# Patient Record
Sex: FEMALE | Race: WHITE
[De-identification: names, ages, dates, MRNs, and addresses within clinical notes are randomized per-mention and may not be internally consistent; named-entity substitution may affect disease eponyms.]

---

## 2019-04-14 ENCOUNTER — HOSPITAL ENCOUNTER (EMERGENCY)
Dept: HOSPITAL 62 - ER | Age: 44
Discharge: HOME | End: 2019-04-14
Payer: MEDICARE

## 2019-04-14 VITALS — DIASTOLIC BLOOD PRESSURE: 72 MMHG | SYSTOLIC BLOOD PRESSURE: 118 MMHG

## 2019-04-14 DIAGNOSIS — J06.9: ICD-10-CM

## 2019-04-14 DIAGNOSIS — R52: ICD-10-CM

## 2019-04-14 DIAGNOSIS — J45.909: ICD-10-CM

## 2019-04-14 DIAGNOSIS — B34.9: Primary | ICD-10-CM

## 2019-04-14 DIAGNOSIS — Z91.013: ICD-10-CM

## 2019-04-14 DIAGNOSIS — R11.2: ICD-10-CM

## 2019-04-14 DIAGNOSIS — R05: ICD-10-CM

## 2019-04-14 DIAGNOSIS — Z88.1: ICD-10-CM

## 2019-04-14 DIAGNOSIS — R19.7: ICD-10-CM

## 2019-04-14 LAB
A TYPE INFLUENZA AG: NEGATIVE
ADD MANUAL DIFF: NO
ANION GAP SERPL CALC-SCNC: 9 MMOL/L (ref 5–19)
B INFLUENZA AG: NEGATIVE
BASOPHILS # BLD AUTO: 0 10^3/UL (ref 0–0.2)
BASOPHILS NFR BLD AUTO: 0.7 % (ref 0–2)
BUN SERPL-MCNC: 13 MG/DL (ref 7–20)
CALCIUM: 9 MG/DL (ref 8.4–10.2)
CHLORIDE SERPL-SCNC: 104 MMOL/L (ref 98–107)
CO2 SERPL-SCNC: 25 MMOL/L (ref 22–30)
EOSINOPHIL # BLD AUTO: 0.3 10^3/UL (ref 0–0.6)
EOSINOPHIL NFR BLD AUTO: 5 % (ref 0–6)
ERYTHROCYTE [DISTWIDTH] IN BLOOD BY AUTOMATED COUNT: 13.8 % (ref 11.5–14)
GLUCOSE SERPL-MCNC: 80 MG/DL (ref 75–110)
HCT VFR BLD CALC: 33.4 % (ref 36–47)
HGB BLD-MCNC: 11.3 G/DL (ref 12–15.5)
LYMPHOCYTES # BLD AUTO: 1.2 10^3/UL (ref 0.5–4.7)
LYMPHOCYTES NFR BLD AUTO: 24.5 % (ref 13–45)
MCH RBC QN AUTO: 28.1 PG (ref 27–33.4)
MCHC RBC AUTO-ENTMCNC: 33.8 G/DL (ref 32–36)
MCV RBC AUTO: 83 FL (ref 80–97)
MONOCYTES # BLD AUTO: 0.5 10^3/UL (ref 0.1–1.4)
MONOCYTES NFR BLD AUTO: 9.2 % (ref 3–13)
NEUTROPHILS # BLD AUTO: 3 10^3/UL (ref 1.7–8.2)
NEUTS SEG NFR BLD AUTO: 60.6 % (ref 42–78)
PLATELET # BLD: 187 10^3/UL (ref 150–450)
POTASSIUM SERPL-SCNC: 4.5 MMOL/L (ref 3.6–5)
RBC # BLD AUTO: 4.03 10^6/UL (ref 3.72–5.28)
SODIUM SERPL-SCNC: 137.8 MMOL/L (ref 137–145)
TOTAL CELLS COUNTED % (AUTO): 100 %
WBC # BLD AUTO: 5 10^3/UL (ref 4–10.5)

## 2019-04-14 PROCEDURE — 96361 HYDRATE IV INFUSION ADD-ON: CPT

## 2019-04-14 PROCEDURE — 87804 INFLUENZA ASSAY W/OPTIC: CPT

## 2019-04-14 PROCEDURE — 80048 BASIC METABOLIC PNL TOTAL CA: CPT

## 2019-04-14 PROCEDURE — 36415 COLL VENOUS BLD VENIPUNCTURE: CPT

## 2019-04-14 PROCEDURE — 99283 EMERGENCY DEPT VISIT LOW MDM: CPT

## 2019-04-14 PROCEDURE — 96374 THER/PROPH/DIAG INJ IV PUSH: CPT

## 2019-04-14 PROCEDURE — 85025 COMPLETE CBC W/AUTO DIFF WBC: CPT

## 2019-04-14 PROCEDURE — 71046 X-RAY EXAM CHEST 2 VIEWS: CPT

## 2019-04-14 NOTE — RADIOLOGY REPORT (SQ)
EXAM DESCRIPTION: 



XR CHEST 2 VIEWS



COMPLETED DATE/TME:  04/14/2019 20:12



CLINICAL HISTORY: 



43 years, Female, cough



Compared to 8/21/2016.



FINDINGS: Heart is mildly enlarged. No consolidation or pleural

effusion. No pulmonary edema or pneumothorax.



IMPRESSION:



No acute disease.

## 2019-04-14 NOTE — ER DOCUMENT REPORT
ED General





- General


Chief Complaint: Nausea/Vomiting/Diarrhea


Stated Complaint: FEVER


Time Seen by Provider: 19 20:02


TRAVEL OUTSIDE OF THE [place] IN LAST 30 DAYS: No





- HPI


Notes: 





Patient is a 43-year-old female presents to the emergency department for 

evaluation.  She states she has a cough.  She has had hot and cold chills.  She 

has had nausea with emesis, but no emesis in the last 24 hours.  Emesis was 

nonbloody, nonbilious.  She had multiple episodes of diarrhea.  She believes she

might be dehydrated.  She has pain "all over."





- Related Data


Allergies/Adverse Reactions: 


                                        





erythromycin base [Erythromycin Base] Allergy (Verified 14 13:23)


   Seizures, N&V


Shellfish * [Shellfish] Allergy (Verified 14 13:23)


   Seizures, N&V


sulfamethoxazole [From Septra] Allergy (Verified 14 13:23)


   Seizures, N&V


trimethoprim [From Septra] Allergy (Verified 14 13:23)


   Seizures, N&V











Past Medical History





- General


Information source: Patient





- Social History


Smoking Status: Never Smoker


Chew tobacco use (# tins/day): No


Frequency of alcohol use: None


Drug Abuse: None


Family History: Reviewed & Not Pertinent


Patient has suicidal ideation: No


Patient has homicidal ideation: No





- Past Medical History


Cardiac Medical History: Reports: Hx Hypertension - gestational only


   Denies: Hx Pulmonary Embolism, Hx Heart Murmur


Pulmonary Medical History: Reports: Hx Asthma


   Denies: Hx Sleep Apnea, Hx Tuberculosis


Neurological Medical History: Denies: Hx Cerebrovascular Accident, Hx Seizures -

Medication related only/Patient denies history


Endocrine Medical History: Reports: Hx Hypothyroidism.  Denies: Hx 

Hyperthyroidism


Renal/ Medical History: Denies: Hx Peritoneal Dialysis


GI Medical History: Reports: Hx Gastroesophageal Reflux Disease.  Denies: Hx 

Hiatal Hernia, Hx Ulcer


Musculoskeletal Medical History: Denies Hx Fibromyalgia, Reports Hx 

Musculoskeletal Trauma


Psychiatric Medical History: Reports: Hx Bipolar Disorder - No meds currently


   Denies: Hx Depression, Hx Post Traumatic Stress Disorder, Hx Schizophrenia


Traumatic Medical History: Denies: Hx Fractures


Infectious Medical History: Denies: Hx HIV


Past Surgical History: Reports: Hx  Section, Hx Orthopedic Surgery - 

sugery on fooot when 12, Hx Tonsillectomy





- Immunizations


Immunizations up to date: Yes


Hx Diphtheria, Pertussis, Tetanus Vaccination: Yes - 





Review of Systems





- Review of Systems


Constitutional: See HPI


EENT: See HPI


Cardiovascular: No symptoms reported


Respiratory: See HPI


Gastrointestinal: See HPI


Genitourinary: No symptoms reported


Female Genitourinary: No symptoms reported


Musculoskeletal: No symptoms reported


Skin: No symptoms reported


Neurological/Psychological: No symptoms reported





Physical Exam





- Vital signs


Vitals: 


                                        











Temp Pulse Resp BP Pulse Ox


 


 97.4 F   76   20   135/78 H  98 


 


 19 19:22  19 19:22  19 19:22  19 19:22  19 19:22














- Notes


Notes: 





Vital signs reviewed, please refer to chart.  Patient is normocephalic, 

atraumatic.  Pupils equal round, reactive to light.  Oral mucosa is moist.  

Pharynx without erythema or exudate.  Neck is supple without meningismus.  Heart

is regular rate and rhythm.  Lungs are clear to auscultation bilaterally.  

Abdomen is soft, nontender, normoactive bowel sounds throughout.  Extremities 

without cyanosis, clubbing, edema.  Peripheral pulses are equal.  Skin is warm 

and dry.  Patient is awake, alert, neurological exam is nonfocal.





Course





- Re-evaluation


Re-evalutation: 





19 21:26


Patient presents emergency department for evaluation.  Influenza swab, blood 

work, medications were ordered.  Her chest x-ray is unremarkable.  Influenza 

swab is negative.  We will continue to follow.


19 22:33


Laboratory visitations are unremarkable.  She has no signs of pneumonia.  In

fluenza swab was negative.  We will treat her symptomatically, she was reassured

that she does not need antibiotics at this time.  She is to return to the 

emergency department with worsening or new concerning symptoms of any sort.





- Vital Signs


Vital signs: 


                                        











Temp Pulse Resp BP Pulse Ox


 


 97.4 F   76   20   135/78 H  99 


 


 19 19:22  19 19:22  19 19:22  19 19:22  19 21:00














- Laboratory


Result Diagrams: 


                                 19 21:10





                                 19 21:10


Laboratory results interpreted by me: 


                                        











  19





  21:10


 


Hgb  11.3 L


 


Hct  33.4 L














Discharge





- Discharge


Clinical Impression: 


 Viral syndrome





Diarrhea


Qualifiers:


 Diarrhea type: presumed infectious Qualified Code(s): R19.7 - Diarrhea, 

unspecified





Upper respiratory infection


Qualifiers:


 URI type: unspecified URI Qualified Code(s): J06.9 - Acute upper respiratory 

infection, unspecified





Condition: Stable


Disposition: HOME, SELF-CARE


Instructions:  Diarrhea, Nonspecific ([facility]), Viral Syndrome ([facility])


Additional Instructions: 


Take medications as prescribed.  Rest, stay well-hydrated.  Follow-up with your 

primary care physician this week.  Return to the emergency department with 

worsening or new concerning symptoms of any sort.

## 2020-06-26 NOTE — RADIOLOGY REPORT (SQ)
EXAM DESCRIPTION:  LUMBAR SPINE COMPLETE



IMAGES COMPLETED DATE/TIME:  6/26/2020 12:00 pm



REASON FOR STUDY:  THORACIC BACK PAIN; MULTIFACTORIAL LOW BACK PAIN M54.6  PAIN IN THORACIC SPINE E55
.9  VITAMIN D DEFICIENCY, UNSPECIFIED



COMPARISON:  None.



NUMBER OF VIEWS:  Five views including obliques.



TECHNIQUE:  AP, lateral, oblique, and sacral radiographic images acquired of the lumbar spine.



LIMITATIONS:  None.



FINDINGS:  MINERALIZATION: Normal.

SEGMENTATION: Normal.  No transitional anatomy.

ALIGNMENT: Normal.

VERTEBRAE: Maintained height.  No fracture or worrisome bone lesion.

DISCS: Preserved height.  No significant osteophytes or end plate irregularity.

POSTERIOR ELEMENTS: Pedicles and facets are intact.  No pars defect or posterior arch defects.

HARDWARE: None in the spine.

PARASPINAL SOFT TISSUES: Normal.

PELVIS: Intact as visualized. No fractures or worrisome bone lesions. SI joints intact.

OTHER: No other significant finding.



IMPRESSION:  NORMAL 5 VIEW LUMBAR SPINE.



TECHNICAL DOCUMENTATION:  JOB ID:  [id number]

 2011 [employer]- All Rights Reserved



Reading location - IP/workstation name: [email or web address]

## 2020-06-26 NOTE — RADIOLOGY REPORT (SQ)
EXAM DESCRIPTION:  T SPINE AP/LAT



IMAGES COMPLETED DATE/TIME:  6/26/2020 12:00 pm



REASON FOR STUDY:  THORACIC BACK PAIN; MULTIFACTORIAL LOW BACK PAIN M54.6  PAIN IN THORACIC SPINE E55
.9  VITAMIN D DEFICIENCY, UNSPECIFIED



COMPARISON:  None.



NUMBER OF VIEWS:  Two views.



TECHNIQUE:  AP and lateral radiographic images acquired of the thoracic spine.



LIMITATIONS:  None.



FINDINGS:  MINERALIZATION: Normal.

ALIGNMENT: Minimal levoscoliosis in the upper thoracic spine.

VERTEBRAE: No fracture or bone lesion.  Maintained height, normal segmentation.

DISCS: No significant loss of height or significant narrowing.  No large osteophytes.

HARDWARE: None in the spine.

MEDIASTINUM AND SOFT TISSUES: Normal heart size and aortic contour.  No soft tissue abnormality.

VISUALIZED LUNG FIELDS: Clear.

OTHER: No other significant finding.



IMPRESSION:  Minimal scoliosis.



TECHNICAL DOCUMENTATION:  JOB ID:  [id number]

 2011 [employer]- All Rights Reserved



Reading location - IP/workstation name: [email or web address]

## 2020-10-30 NOTE — RADIOLOGY REPORT (SQ)
EXAM DESCRIPTION:  HIPS BILATERAL



IMAGES COMPLETED DATE/TIME:  10/30/2020 1:31 pm



REASON FOR STUDY:  HIP PAIN - LEFT, SI JT PAIN M25.552  PAIN IN LEFT HIP



COMPARISON:  None.



NUMBER OF VIEWS:  Two views



TECHNIQUE:  AP pelvis and additional frog-leg view of both hips.



LIMITATIONS:  None.



FINDINGS:  MINERALIZATION: Normal.

HIPS: No acute fracture or dislocation. No worrisome bone lesions.

PELVIS AND SACRUM:  No acute fracture or dislocation. No worrisome bone lesions.

PUBIS AND ISCHIUM: No acute fracture.

LOWER LUMBAR SPINE: No significant findings as visualized.

SOFT TISSUES: No findings.

OTHER: No other significant finding.



IMPRESSION:  NEGATIVE STUDY OF THE PELVIS AND HIPS.



TECHNICAL DOCUMENTATION:  JOB ID:  [id number]

 2011 [employer]- All Rights Reserved



Reading location - IP/workstation name: [email or web address]